# Patient Record
Sex: FEMALE | Race: WHITE | URBAN - NONMETROPOLITAN AREA
[De-identification: names, ages, dates, MRNs, and addresses within clinical notes are randomized per-mention and may not be internally consistent; named-entity substitution may affect disease eponyms.]

---

## 2017-06-20 ENCOUNTER — OPTICAL OFFICE (OUTPATIENT)
Dept: URBAN - NONMETROPOLITAN AREA CLINIC 4 | Facility: CLINIC | Age: 27
Setting detail: OPHTHALMOLOGY
End: 2017-06-20
Payer: COMMERCIAL

## 2017-06-20 DIAGNOSIS — H52.13: ICD-10-CM

## 2017-06-20 PROCEDURE — S0500 DISPOS CONT LENS: HCPCS | Performed by: OPTOMETRIST

## 2018-04-18 ENCOUNTER — DOCTOR'S OFFICE (OUTPATIENT)
Dept: URBAN - METROPOLITAN AREA CLINIC 136 | Facility: CLINIC | Age: 28
Setting detail: OPHTHALMOLOGY
End: 2018-04-18
Payer: COMMERCIAL

## 2018-04-18 DIAGNOSIS — H47.333: ICD-10-CM

## 2018-04-18 DIAGNOSIS — H52.13: ICD-10-CM

## 2018-04-18 DIAGNOSIS — H52.223: ICD-10-CM

## 2018-04-18 PROCEDURE — NO CHARGE N/C PROFESSIONAL COURTESY: Performed by: OPTOMETRIST

## 2018-04-18 PROCEDURE — 92133 CPTRZD OPH DX IMG PST SGM ON: CPT | Performed by: OPTOMETRIST

## 2018-04-18 ASSESSMENT — REFRACTION_OUTSIDERX
OD_VA1: 20/20
OS_VA1: 20/20
OD_VA2: 20/20
OS_VA2: 20/20
OS_VA3: 20/
OD_CYLINDER: SPH
OS_SPHERE: -2.00
OD_VA3: 20/
OD_SPHERE: -3.25
OU_VA: 20/20
OS_CYLINDER: -1.00
OS_AXIS: 010

## 2018-04-18 ASSESSMENT — SPHEQUIV_DERIVED
OD_SPHEQUIV: -4
OS_SPHEQUIV: -2.5

## 2018-04-18 ASSESSMENT — REFRACTION_MANIFEST
OU_VA: 20/
OD_VA3: 20/
OD_VA2: 20/
OS_VA2: 20/
OU_VA: 20/
OS_VA1: 20/
OS_VA2: 20/
OD_VA1: 20/
OS_VA1: 20/
OS_VA3: 20/
OD_VA1: 20/
OD_VA3: 20/
OD_VA2: 20/
OS_VA3: 20/

## 2018-04-18 ASSESSMENT — REFRACTION_CURRENTRX
OD_OVR_VA: 20/
OD_CYLINDER: -0.50
OS_AXIS: 010
OS_OVR_VA: 20/
OD_AXIS: 170
OS_SPHERE: -2.75
OS_OVR_VA: 20/
OD_SPHERE: -3.25
OD_OVR_VA: 20/
OS_OVR_VA: 20/
OD_OVR_VA: 20/
OS_CYLINDER: -0.50

## 2018-04-18 ASSESSMENT — VISUAL ACUITY
OD_BCVA: 20/20-2
OS_BCVA: 20/20-2

## 2018-04-18 ASSESSMENT — KERATOMETRY
OS_K1POWER_DIOPTERS: 46.25
OD_K1POWER_DIOPTERS: 46.00
OS_AXISANGLE_DEGREES: 07
OD_AXISANGLE_DEGREES: 171
OD_K2POWER_DIOPTERS: 47.25
METHOD_AUTO_MANUAL: AUTO
OS_K2POWER_DIOPTERS: 47.75

## 2018-04-18 ASSESSMENT — AXIALLENGTH_DERIVED
OD_AL: 24.0098
OS_AL: 23.2844

## 2018-04-18 ASSESSMENT — REFRACTION_AUTOREFRACTION
OD_CYLINDER: -0.50
OS_CYLINDER: -1.00
OD_SPHERE: -3.75
OS_SPHERE: -2.00
OD_AXIS: 17
OS_AXIS: 170

## 2018-04-18 ASSESSMENT — CONFRONTATIONAL VISUAL FIELD TEST (CVF)
OD_FINDINGS: FULL
OS_FINDINGS: FULL

## 2018-05-21 ENCOUNTER — OPTICAL OFFICE (OUTPATIENT)
Dept: URBAN - METROPOLITAN AREA CLINIC 140 | Facility: CLINIC | Age: 28
Setting detail: OPHTHALMOLOGY
End: 2018-05-21
Payer: COMMERCIAL

## 2018-05-21 DIAGNOSIS — H52.12: ICD-10-CM

## 2018-05-21 DIAGNOSIS — H52.11: ICD-10-CM

## 2018-05-21 PROCEDURE — S0500 DISPOS CONT LENS: HCPCS | Performed by: OPTOMETRIST

## 2019-01-04 ENCOUNTER — OPTICAL OFFICE (OUTPATIENT)
Dept: URBAN - NONMETROPOLITAN AREA CLINIC 4 | Facility: CLINIC | Age: 29
Setting detail: OPHTHALMOLOGY
End: 2019-01-04

## 2019-01-04 DIAGNOSIS — H52.13: ICD-10-CM

## 2019-01-04 PROCEDURE — S0500 DISPOS CONT LENS: HCPCS | Performed by: OPTOMETRIST

## 2019-06-18 ENCOUNTER — DOCTOR'S OFFICE (OUTPATIENT)
Dept: URBAN - METROPOLITAN AREA CLINIC 136 | Facility: CLINIC | Age: 29
Setting detail: OPHTHALMOLOGY
End: 2019-06-18
Payer: COMMERCIAL

## 2019-06-18 DIAGNOSIS — H52.13: ICD-10-CM

## 2019-06-18 DIAGNOSIS — H52.223: ICD-10-CM

## 2019-06-18 PROBLEM — H47.333 PSEUDOPAPILLEDEMA; BOTH EYES: Status: ACTIVE | Noted: 2019-06-18

## 2019-06-18 PROCEDURE — 92014 COMPRE OPH EXAM EST PT 1/>: CPT | Performed by: OPTOMETRIST

## 2019-06-18 ASSESSMENT — REFRACTION_CURRENTRX
OD_OVR_VA: 20/
OS_SPHERE: -2.75
OS_AXIS: 010
OD_CYLINDER: -0.50
OS_OVR_VA: 20/
OD_SPHERE: -3.25
OS_CYLINDER: -0.50
OD_AXIS: 170
OD_OVR_VA: 20/
OD_OVR_VA: 20/

## 2019-06-18 ASSESSMENT — REFRACTION_MANIFEST
OU_VA: 20/20
OD_VA1: 20/
OD_VA3: 20/
OD_VA2: 20/20
OD_SPHERE: -3.00
OS_VA2: 20/
OS_SPHERE: -1.50
OD_CYLINDER: SPH
OD_VA1: 20/20
OS_VA1: 20/
OD_VA3: 20/
OS_CYLINDER: -0.75
OS_VA3: 20/
OS_VA2: 20/20
OU_VA: 20/
OS_VA1: 20/20
OD_VA2: 20/
OS_AXIS: 180
OS_VA3: 20/

## 2019-06-18 ASSESSMENT — SPHEQUIV_DERIVED
OS_SPHEQUIV: -2.125
OD_SPHEQUIV: -3.75
OS_SPHEQUIV: -1.875

## 2019-06-18 ASSESSMENT — KERATOMETRY
OD_AXISANGLE_DEGREES: 171
OD_K1POWER_DIOPTERS: 46.00
OS_K1POWER_DIOPTERS: 46.25
METHOD_AUTO_MANUAL: AUTO
OD_K2POWER_DIOPTERS: 47.25
OS_K2POWER_DIOPTERS: 47.75
OS_AXISANGLE_DEGREES: 07

## 2019-06-18 ASSESSMENT — REFRACTION_AUTOREFRACTION
OS_SPHERE: -1.75
OS_AXIS: 168
OD_AXIS: 052
OS_CYLINDER: -0.75
OD_SPHERE: -3.50
OD_CYLINDER: -0.50

## 2019-06-18 ASSESSMENT — VISUAL ACUITY
OD_BCVA: 20/20-1
OS_BCVA: 20/20-1

## 2019-06-18 ASSESSMENT — AXIALLENGTH_DERIVED
OS_AL: 23.1429
OS_AL: 23.0496
OD_AL: 23.9093

## 2019-06-18 ASSESSMENT — CONFRONTATIONAL VISUAL FIELD TEST (CVF)
OD_FINDINGS: FULL
OS_FINDINGS: FULL

## 2019-07-09 ENCOUNTER — OPTICAL OFFICE (OUTPATIENT)
Dept: URBAN - NONMETROPOLITAN AREA CLINIC 4 | Facility: CLINIC | Age: 29
Setting detail: OPHTHALMOLOGY
End: 2019-07-09
Payer: COMMERCIAL

## 2019-07-09 DIAGNOSIS — H52.13: ICD-10-CM

## 2019-07-09 PROCEDURE — S0500 DISPOS CONT LENS: HCPCS | Performed by: OPTOMETRIST

## 2019-07-19 ENCOUNTER — DOCTOR'S OFFICE (OUTPATIENT)
Dept: URBAN - NONMETROPOLITAN AREA CLINIC 1 | Facility: CLINIC | Age: 29
Setting detail: OPHTHALMOLOGY
End: 2019-07-19
Payer: COMMERCIAL

## 2019-07-19 DIAGNOSIS — H47.333: ICD-10-CM

## 2019-07-19 PROCEDURE — 92083 EXTENDED VISUAL FIELD XM: CPT | Performed by: OPTOMETRIST

## 2022-01-28 ENCOUNTER — OFFICE VISIT (OUTPATIENT)
Dept: URGENT CARE | Facility: CLINIC | Age: 32
End: 2022-01-28
Payer: COMMERCIAL

## 2022-01-28 VITALS
RESPIRATION RATE: 15 BRPM | BODY MASS INDEX: 30.12 KG/M2 | OXYGEN SATURATION: 99 % | HEIGHT: 63 IN | TEMPERATURE: 97.5 F | WEIGHT: 170 LBS | HEART RATE: 92 BPM

## 2022-01-28 DIAGNOSIS — R68.83 CHILLS: Primary | ICD-10-CM

## 2022-01-28 DIAGNOSIS — R68.89 FLU-LIKE SYMPTOMS: ICD-10-CM

## 2022-01-28 PROCEDURE — U0003 INFECTIOUS AGENT DETECTION BY NUCLEIC ACID (DNA OR RNA); SEVERE ACUTE RESPIRATORY SYNDROME CORONAVIRUS 2 (SARS-COV-2) (CORONAVIRUS DISEASE [COVID-19]), AMPLIFIED PROBE TECHNIQUE, MAKING USE OF HIGH THROUGHPUT TECHNOLOGIES AS DESCRIBED BY CMS-2020-01-R: HCPCS | Performed by: EMERGENCY MEDICINE

## 2022-01-28 PROCEDURE — U0005 INFEC AGEN DETEC AMPLI PROBE: HCPCS | Performed by: EMERGENCY MEDICINE

## 2022-01-28 PROCEDURE — 99203 OFFICE O/P NEW LOW 30 MIN: CPT | Performed by: EMERGENCY MEDICINE

## 2022-01-28 RX ORDER — ONDANSETRON HYDROCHLORIDE 8 MG/1
8 TABLET, FILM COATED ORAL EVERY 8 HOURS PRN
Qty: 8 TABLET | Refills: 0 | Status: SHIPPED | OUTPATIENT
Start: 2022-01-28

## 2022-01-28 RX ORDER — NORETHINDRONE ACETATE AND ETHINYL ESTRADIOL 1; .02 MG/1; MG/1
1 TABLET ORAL DAILY
COMMUNITY
Start: 2022-01-26

## 2022-01-28 NOTE — PATIENT INSTRUCTIONS
You have been diagnosed with a flu-like illness, and your symptoms should resolve over the next 7 to 10 days with the treatments recommended today  If they do not, it is possible that you have developed a bacterial infection and you should return  If you were to take an antibiotic while you are still in the viral stage, you will not get better any faster, but could kill off good germs in your body as well as make germs resistant to the antibiotic  Take an expectorant - guaifenesin should be the only ingredient - during the day, and the cough suppressant (ex  Robitussin DM or Tessalon) if needed at night only  Take Zinc 50 mg every 12 hours for the next week  You should also take Quercetin 500 mg twice daily with it  You should also take vitamin D3 5000 i u s per day for the next 1 week, and vitamin-C 1 g daily  May take Flonase as discussed  You may also take a decongestant like Sudafed, unless you have hypertension or cardiac disease  You may take Imodium for diarrhea according to package instructions  Today you were tested for COVID-19 and influenza  Results will return approximately 5 days  The fastest way to receive results is to download the [facility] MyChart shawn on your phone or great account on a computer  If you view your results on [facility], we will not call you  If you do not see results on [facility], we will call you if your positive or negative  WE CANNOT PRINT YOUR TEST RESULTS FROM THE URGENT CARE  This is against a law called HIPAA  You may print results from your [facility] (IT help [phone] option 5) or call medical records at [phone]  Prophylactically self quarantine  Department of health's newest recommendations as of December 27,2021 state the following:     IF you TEST POSITIVE for COVID-19  -stay home for 5 days  If you have no symptoms or your symptoms are resolving after 5 days, you can leave your house  Continue to wear a mask around others for 5 additional days  If you have a fever, continue to stay home until you fever resolves  IF YOU WERE EXPOSED TO SOMEONE WITH COVID 19  -if you have been boosted OR completed the primary series of Pfizer or Moderna vaccine within the last 6 months OR completed the primary series of J&J vaccine within the last 2 months, wear a mask around others for 10 days  Get tested on day 5 if possible  If you develop symptoms, get a test and stay home      -if you completed the primary series of Pfizer or Moderna vaccine over 6 months ago and are NOT boosted OR completed the primary series of J&J over 2 months ago and are NOT boosted OR are unvaccinated, stay home for 5 days  After that continue to wear a mask around others for 5 additional days  If you can not quarantine you must wear a mask for 10 days  Test on day 5 if possible  If you develops symptoms get a test and stay home  Drink lots of fluids to maintain hydration  Do not touch your face, wash hands often, and practice social distancing  There is no treatment for simple outpatient COVID-19 patients however, CDC recommends 2000 units vitamin D3 to boost the immune system  Those with severe illness, older age, or multiple comorbidities may qualify for monoclonal antibody infusions as treatment  Please call your doctor to see if you qualify  Call your family doctor to have a follow-up appointment in next few days  Go to ER if he began experiencing chest pain, shortness of breath, fever that is not responding to antipyretics or other severe symptoms  Follow up with PCP in 3-5 days  Proceed to ER if symptoms worsen  Flu-like illness   AMBULATORY CARE:   Flu-like illness is an infection caused by a virus  The flu is easily spread when an infected person coughs, sneezes, or has close contact with others  You may be able to spread the flu to others for 1 week or longer after signs or symptoms appear     Common signs and symptoms include the following:   · Fever and chills    · Headaches, body aches, and muscle or joint pain    · Cough, runny nose, and sore throat    · Loss of appetite, nausea, vomiting, or diarrhea    · Tiredness    · Trouble breathing  Call 911 for any of the following:   · You have trouble breathing, and your lips look purple or blue  · You have a seizure  Seek care immediately if:   · You are dizzy, or you are urinating less or not at all  · You have a headache with a stiff neck, and you feel tired or confused  · You have new pain or pressure in your chest     · Your symptoms, such as shortness of breath, vomiting, or diarrhea, get worse  · Your symptoms, such as fever and coughing, seem to get better, but then get worse  Contact your healthcare provider if:   · You have new muscle pain or weakness  · You have questions or concerns about your condition or care  Treatment for influenza  may include any of the following:  · Acetaminophen decreases pain and fever  It is available without a doctor's order  Ask how much to take and how often to take it  Follow directions  Acetaminophen can cause liver damage if not taken correctly  · NSAIDs  such as ibuprofen, help decrease swelling, pain, and fever  This medicine is available with or without a doctor's order  NSAIDs can cause stomach bleeding or kidney problems in certain people  If you take blood thinner medicine, always ask your healthcare provider if NSAIDs are safe for you  Always read the medicine label and follow directions  · Antivirals  help fight a viral infection  Manage your symptoms:   · Rest  as much as you can to help you recover  · Drink liquids as directed  to help prevent dehydration  Ask how much liquid to drink each day and which liquids are best for you  Prevent the spread of the flu:   · Wash your hands often  Use soap and water  Wash your hands after you use the bathroom, change a child's diapers, or sneeze  Wash your hands before you prepare or eat food  Use gel hand cleanser when soap and water are not available  Do not touch your eyes, nose, or mouth unless you have washed your hands first        · Cover your mouth when you sneeze or cough  Cough into a tissue or the bend of your arm  · Clean shared items with a germ-killing   Clean table surfaces, doorknobs, and light switches  Do not share towels, silverware, and dishes with people who are sick  Wash bed sheets, towels, silverware, and dishes with soap and water  · Wear a mask  over your mouth and nose if you are sick or are near anyone who is sick  · Stay away from others  if you are sick  · Influenza vaccine  helps prevent influenza (flu)  Everyone older than 6 months should get a yearly influenza vaccine  Get the vaccine as soon as it is available, usually in September or October each year  Follow up with your healthcare provider as directed:  Write down your questions so you remember to ask them during your visits  © 2017 [employer]  Information is for End User's use only and may not be sold, redistributed or otherwise used for commercial purposes  All illustrations and images included in CareNotes® are the copyrighted property of [employer]  or [employer]  The above information is an  only  It is not intended as medical advice for individual conditions or treatments  Talk to your doctor, nurse or pharmacist before following any medical regimen to see if it is safe and effective for you  [address]     Your healthcare provider and/or public health staff have evaluated you and have determined that you do not need to be hospitalized at this time  At this time you can be isolated at home where you will be monitored by staff from your local or state health department   You should carefully follow the prevention and isolation steps below until a healthcare provider or local or state health department says that you can return to your normal activities  Stay home except to get medical care     People who are mildly ill with COVID-19 are able to isolate at home during their illness  You should restrict activities outside your home, except for getting medical care  Do not go to work, school, or public areas  Avoid using public transportation, ride-sharing, or taxis  Separate yourself from other people and animals in your home     People: As much as possible, you should stay in a specific room and away from other people in your home  Also, you should use a separate bathroom, if available  Animals: You should restrict contact with pets and other animals while you are sick with COVID-19, just like you would around other people  Although there have not been reports of pets or other animals becoming sick with COVID-19, it is still recommended that people sick with COVID-19 limit contact with animals until more information is known about the virus  When possible, have another member of your household care for your animals while you are sick  If you are sick with COVID-19, avoid contact with your pet, including petting, snuggling, being kissed or licked, and sharing food  If you must care for your pet or be around animals while you are sick, wash your hands before and after you interact with pets and wear a facemask  See COVID-19 and Animals for more information  Call ahead before visiting your doctor     If you have a medical appointment, call the healthcare provider and tell them that you have or may have COVID-19  This will help the healthcare providers office take steps to keep other people from getting infected or exposed  Wear a facemask     You should wear a facemask when you are around other people (e g , sharing a room or vehicle) or pets and before you enter a healthcare providers office   If you are not able to wear a facemask (for example, because it causes trouble breathing), then people who live with you should not stay in the same room with you, or they should wear a facemask if they enter your room  Cover your coughs and sneezes     Cover your mouth and nose with a tissue when you cough or sneeze  Throw used tissues in a lined trash can  Immediately wash your hands with soap and water for at least 20 seconds or, if soap and water are not available, clean your hands with an alcohol-based hand  that contains at least 60% alcohol  Clean your hands often     Wash your hands often with soap and water for at least 20 seconds, especially after blowing your nose, coughing, or sneezing; going to the bathroom; and before eating or preparing food  If soap and water are not readily available, use an alcohol-based hand  with at least 60% alcohol, covering all surfaces of your hands and rubbing them together until they feel dry  Soap and water are the best option if hands are visibly dirty  Avoid touching your eyes, nose, and mouth with unwashed hands  Avoid sharing personal household items     You should not share dishes, drinking glasses, cups, eating utensils, towels, or bedding with other people or pets in your home  After using these items, they should be washed thoroughly with soap and water  Clean all high-touch surfaces everyday     High touch surfaces include counters, tabletops, doorknobs, bathroom fixtures, toilets, phones, keyboards, tablets, and bedside tables  Also, clean any surfaces that may have blood, stool, or body fluids on them  Use a household cleaning spray or wipe, according to the label instructions  Labels contain instructions for safe and effective use of the cleaning product including precautions you should take when applying the product, such as wearing gloves and making sure you have good ventilation during use of the product  Monitor your symptoms     Seek prompt medical attention if your illness is worsening (e g , difficulty breathing)  Before seeking care, call your healthcare provider and tell them that you have, or are being evaluated for, COVID-19  Put on a facemask before you enter the facility  These steps will help the healthcare providers office to keep other people in the office or waiting room from getting infected or exposed  Ask your healthcare provider to call the local or state health department  Persons who are placed under active monitoring or facilitated self-monitoring should follow instructions provided by their local health department or occupational health professionals, as appropriate  If you have a medical emergency and need to call 911, notify the dispatch personnel that you have, or are being evaluated for COVID-19  If possible, put on a facemask before emergency medical services arrive  Discontinuing home isolation     Patients with confirmed COVID-19 should remain under home isolation precautions until the risk of secondary transmission to others is thought to be low  The decision to discontinue home isolation precautions should be made on a case-by-case basis, in consultation with healthcare providers and state and local health departments       Source: [employer]        Proceed to ER if symptoms worsen

## 2022-01-28 NOTE — PROGRESS NOTES
[facility]        NAME: Jose Chambers is a 36 y o  female  : 1990    MRN: [id number]  DATE: 2022  TIME: 8:26 AM    Assessment and Plan   Chills [R68 83]  1  Chills  COVID Only -Office Collect   2  Flu-like symptoms           Patient Instructions     Patient Instructions     You have been diagnosed with a flu-like illness, and your symptoms should resolve over the next 7 to 10 days with the treatments recommended today  If they do not, it is possible that you have developed a bacterial infection and you should return  If you were to take an antibiotic while you are still in the viral stage, you will not get better any faster, but could kill off good germs in your body as well as make germs resistant to the antibiotic  Take an expectorant - guaifenesin should be the only ingredient - during the day, and the cough suppressant (ex  Robitussin DM or Tessalon) if needed at night only  Take Zinc 50 mg every 12 hours for the next week  You should also take Quercetin 500 mg twice daily with it  You should also take vitamin D3 5000 i u s per day for the next 1 week, and vitamin-C 1 g daily  May take Flonase as discussed  You may also take a decongestant like Sudafed, unless you have hypertension or cardiac disease  You may take Imodium for diarrhea according to package instructions  Today you were tested for COVID-19 and influenza  Results will return approximately 5 days  The fastest way to receive results is to download the [facility] MyChart shawn on your phone or great account on a computer  If you view your results on [facility], we will not call you  If you do not see results on [facility], we will call you if your positive or negative  WE CANNOT PRINT YOUR TEST RESULTS FROM THE URGENT CARE  This is against a law called HIPAA  You may print results from your [facility] (IT help [phone] option 5) or call medical records at [phone]  Prophylactically self quarantine  Department of health's newest recommendations as of December 27,2021 state the following:     IF you TEST POSITIVE for COVID-19  -stay home for 5 days  If you have no symptoms or your symptoms are resolving after 5 days, you can leave your house  Continue to wear a mask around others for 5 additional days  If you have a fever, continue to stay home until you fever resolves  IF YOU WERE EXPOSED TO SOMEONE WITH COVID 19  -if you have been boosted OR completed the primary series of Pfizer or Moderna vaccine within the last 6 months OR completed the primary series of J&J vaccine within the last 2 months, wear a mask around others for 10 days  Get tested on day 5 if possible  If you develop symptoms, get a test and stay home      -if you completed the primary series of Pfizer or Moderna vaccine over 6 months ago and are NOT boosted OR completed the primary series of J&J over 2 months ago and are NOT boosted OR are unvaccinated, stay home for 5 days  After that continue to wear a mask around others for 5 additional days  If you can not quarantine you must wear a mask for 10 days  Test on day 5 if possible  If you develops symptoms get a test and stay home  Drink lots of fluids to maintain hydration  Do not touch your face, wash hands often, and practice social distancing  There is no treatment for simple outpatient COVID-19 patients however, CDC recommends 2000 units vitamin D3 to boost the immune system  Those with severe illness, older age, or multiple comorbidities may qualify for monoclonal antibody infusions as treatment  Please call your doctor to see if you qualify  Call your family doctor to have a follow-up appointment in next few days  Go to ER if he began experiencing chest pain, shortness of breath, fever that is not responding to antipyretics or other severe symptoms  Follow up with PCP in 3-5 days  Proceed to ER if symptoms worsen      Flu-like illness   AMBULATORY CARE:   Flu-like illness is an infection caused by a virus  The flu is easily spread when an infected person coughs, sneezes, or has close contact with others  You may be able to spread the flu to others for 1 week or longer after signs or symptoms appear  Common signs and symptoms include the following:   · Fever and chills    · Headaches, body aches, and muscle or joint pain    · Cough, runny nose, and sore throat    · Loss of appetite, nausea, vomiting, or diarrhea    · Tiredness    · Trouble breathing  Call 911 for any of the following:   · You have trouble breathing, and your lips look purple or blue  · You have a seizure  Seek care immediately if:   · You are dizzy, or you are urinating less or not at all  · You have a headache with a stiff neck, and you feel tired or confused  · You have new pain or pressure in your chest     · Your symptoms, such as shortness of breath, vomiting, or diarrhea, get worse  · Your symptoms, such as fever and coughing, seem to get better, but then get worse  Contact your healthcare provider if:   · You have new muscle pain or weakness  · You have questions or concerns about your condition or care  Treatment for influenza  may include any of the following:  · Acetaminophen decreases pain and fever  It is available without a doctor's order  Ask how much to take and how often to take it  Follow directions  Acetaminophen can cause liver damage if not taken correctly  · NSAIDs  such as ibuprofen, help decrease swelling, pain, and fever  This medicine is available with or without a doctor's order  NSAIDs can cause stomach bleeding or kidney problems in certain people  If you take blood thinner medicine, always ask your healthcare provider if NSAIDs are safe for you  Always read the medicine label and follow directions  · Antivirals  help fight a viral infection  Manage your symptoms:   · Rest  as much as you can to help you recover      · Drink liquids as directed  to help prevent dehydration  Ask how much liquid to drink each day and which liquids are best for you  Prevent the spread of the flu:   · Wash your hands often  Use soap and water  Wash your hands after you use the bathroom, change a child's diapers, or sneeze  Wash your hands before you prepare or eat food  Use gel hand cleanser when soap and water are not available  Do not touch your eyes, nose, or mouth unless you have washed your hands first        · Cover your mouth when you sneeze or cough  Cough into a tissue or the bend of your arm  · Clean shared items with a germ-killing   Clean table surfaces, doorknobs, and light switches  Do not share towels, silverware, and dishes with people who are sick  Wash bed sheets, towels, silverware, and dishes with soap and water  · Wear a mask  over your mouth and nose if you are sick or are near anyone who is sick  · Stay away from others  if you are sick  · Influenza vaccine  helps prevent influenza (flu)  Everyone older than 6 months should get a yearly influenza vaccine  Get the vaccine as soon as it is available, usually in September or October each year  Follow up with your healthcare provider as directed:  Write down your questions so you remember to ask them during your visits  © 2017 [employer]  Information is for End User's use only and may not be sold, redistributed or otherwise used for commercial purposes  All illustrations and images included in CareNotes® are the copyrighted property of [employer]  or [employer]  The above information is an  only  It is not intended as medical advice for individual conditions or treatments  Talk to your doctor, nurse or pharmacist before following any medical regimen to see if it is safe and effective for you       COVID-19    [address]     Your healthcare provider and/or public health staff have evaluated you and have determined that you do not need to be hospitalized at this time  At this time you can be isolated at home where you will be monitored by staff from your local or state health department  You should carefully follow the prevention and isolation steps below until a healthcare provider or local or state health department says that you can return to your normal activities  Stay home except to get medical care     People who are mildly ill with COVID-19 are able to isolate at home during their illness  You should restrict activities outside your home, except for getting medical care  Do not go to work, school, or public areas  Avoid using public transportation, ride-sharing, or taxis  Separate yourself from other people and animals in your home     People: As much as possible, you should stay in a specific room and away from other people in your home  Also, you should use a separate bathroom, if available  Animals: You should restrict contact with pets and other animals while you are sick with COVID-19, just like you would around other people  Although there have not been reports of pets or other animals becoming sick with COVID-19, it is still recommended that people sick with COVID-19 limit contact with animals until more information is known about the virus  When possible, have another member of your household care for your animals while you are sick  If you are sick with COVID-19, avoid contact with your pet, including petting, snuggling, being kissed or licked, and sharing food  If you must care for your pet or be around animals while you are sick, wash your hands before and after you interact with pets and wear a facemask  See COVID-19 and Animals for more information  Call ahead before visiting your doctor     If you have a medical appointment, call the healthcare provider and tell them that you have or may have COVID-19   This will help the healthcare providers office take steps to keep other people from getting infected or exposed  Wear a facemask     You should wear a facemask when you are around other people (e g , sharing a room or vehicle) or pets and before you enter a healthcare providers office  If you are not able to wear a facemask (for example, because it causes trouble breathing), then people who live with you should not stay in the same room with you, or they should wear a facemask if they enter your room  Cover your coughs and sneezes     Cover your mouth and nose with a tissue when you cough or sneeze  Throw used tissues in a lined trash can  Immediately wash your hands with soap and water for at least 20 seconds or, if soap and water are not available, clean your hands with an alcohol-based hand  that contains at least 60% alcohol  Clean your hands often     Wash your hands often with soap and water for at least 20 seconds, especially after blowing your nose, coughing, or sneezing; going to the bathroom; and before eating or preparing food  If soap and water are not readily available, use an alcohol-based hand  with at least 60% alcohol, covering all surfaces of your hands and rubbing them together until they feel dry  Soap and water are the best option if hands are visibly dirty  Avoid touching your eyes, nose, and mouth with unwashed hands  Avoid sharing personal household items     You should not share dishes, drinking glasses, cups, eating utensils, towels, or bedding with other people or pets in your home  After using these items, they should be washed thoroughly with soap and water  Clean all high-touch surfaces everyday     High touch surfaces include counters, tabletops, doorknobs, bathroom fixtures, toilets, phones, keyboards, tablets, and bedside tables  Also, clean any surfaces that may have blood, stool, or body fluids on them  Use a household cleaning spray or wipe, according to the label instructions   Labels contain instructions for safe and effective use of the cleaning product including precautions you should take when applying the product, such as wearing gloves and making sure you have good ventilation during use of the product  Monitor your symptoms     Seek prompt medical attention if your illness is worsening (e g , difficulty breathing)  Before seeking care, call your healthcare provider and tell them that you have, or are being evaluated for, COVID-19  Put on a facemask before you enter the facility  These steps will help the healthcare providers office to keep other people in the office or waiting room from getting infected or exposed  Ask your healthcare provider to call the local or [facility] health department  Persons who are placed under active monitoring or facilitated self-monitoring should follow instructions provided by their local health department or occupational health professionals, as appropriate  If you have a medical emergency and need to call 911, notify the dispatch personnel that you have, or are being evaluated for COVID-19  If possible, put on a facemask before emergency medical services arrive  Discontinuing home isolation     Patients with confirmed COVID-19 should remain under home isolation precautions until the risk of secondary transmission to others is thought to be low  The decision to discontinue home isolation precautions should be made on a case-by-case basis, in consultation with healthcare providers and [facility] and [facility] health departments  Source: [employer]        Proceed to ER if symptoms worsen          Follow up with PCP in 3-5 days  Proceed to  ER if symptoms worsen  Chief Complaint     Chief Complaint   Patient presents with    COVID-19     c/o nausea, headache, chills, onset yesterday, not fully vaccinated         History of Present Illness       Patient complains of chills, headaches, nausea since yesterday        Review of Systems   Review of Systems   Constitutional: Positive for chills  Negative for appetite change, fatigue and fever  HENT: Negative for congestion, rhinorrhea, sinus pressure, sore throat, trouble swallowing and voice change  Respiratory: Negative for cough, chest tightness, shortness of breath and wheezing  Cardiovascular: Negative for chest pain  Gastrointestinal: Positive for nausea  Musculoskeletal: Negative for myalgias  Neurological: Positive for headaches  Current Medications       Current Outpatient Medications:     norethindrone-ethinyl estradiol (MICROGESTIN 1/20) 1-20 MG-MCG per tablet, Take 1 tablet by mouth in the morning, Disp: , Rfl:     Current Allergies     Allergies as of 01/28/2022    (No Known Allergies)            The following portions of the patient's history were reviewed and updated as appropriate: allergies, current medications, past family history, past medical history, past social history, past surgical history and problem list      History reviewed  No pertinent past medical history  Past Surgical History:   Procedure Laterality Date    BREAST BIOPSY Right        Family History   Problem Relation Age of Onset    Heart disease Father          Medications have been verified  Objective   Ht 5' 3" (1 6 m)   Wt 77 1 kg (170 lb)   LMP 01/14/2022 (Approximate)   BMI 30 11 kg/m²        Physical Exam     Physical Exam  Vitals and nursing note reviewed  Constitutional:       General: She is not in acute distress  Appearance: She is well-developed  HENT:      Head: Normocephalic and atraumatic  Right Ear: Tympanic membrane and external ear normal       Left Ear: Tympanic membrane and external ear normal       Nose: Mucosal edema present  Mouth/Throat:      Pharynx: No oropharyngeal exudate or posterior oropharyngeal erythema  Tonsils: No tonsillar abscesses     Eyes:      Conjunctiva/sclera: Conjunctivae normal       Pupils: Pupils are equal, round, and reactive to light  Funduscopic exam:     Right eye: No hemorrhage or papilledema  Left eye: No hemorrhage or papilledema  Comments: Discs flat with sharp borders, no hemorrhages or exudates  Cardiovascular:      Rate and Rhythm: Normal rate and regular rhythm  Comments: Mild tachycardia  Pulmonary:      Effort: Pulmonary effort is normal  No respiratory distress  Breath sounds: No wheezing or rales  Abdominal:      General: Bowel sounds are normal       Palpations: Abdomen is soft  Musculoskeletal:      Cervical back: Neck supple  Skin:     General: Skin is warm and dry  Findings: No rash  Neurological:      Mental Status: She is alert and oriented to person, place, and time  Psychiatric:         Mood and Affect: Mood normal          Behavior: Behavior normal          Thought Content:  Thought content normal          Judgment: Judgment normal

## 2022-01-28 NOTE — LETTER
January 28, 2022     Patient: Dean Turner   YOB: 1990   Date of Visit: 1/28/2022       To Whom it May Concern:    Dean Turner was seen in my clinic on 1/28/2022  She should remain out of work/school for 5 days since symptom onset or 24 hours fever free without the use of fever reducing drugs, whichever is longer AND overall general improvement in symptoms and must adhere to strict masking guidelines for 5 more days         If you have any questions or concerns, please don't hesitate to call           Sincerely,          Óscar Deleon MD        CC: No Recipients

## 2022-01-29 LAB — SARS-COV-2 RNA RESP QL NAA+PROBE: NEGATIVE

## 2022-07-20 NOTE — PATIENT INSTRUCTIONS
Wellness Visit for Adults   AMBULATORY CARE:   A wellness visit  is when you see your healthcare provider to get screened for health problems  Your healthcare provider will also give you advice on how to stay healthy  Write down your questions so you remember to ask them  Ask your healthcare provider how often you should have a wellness visit  What happens at a wellness visit:  Your healthcare provider will ask about your health, and your family history of health problems  This includes high blood pressure, heart disease, and cancer  He or she will ask if you have symptoms that concern you, if you smoke, and about your mood  You may also be asked about your intake of medicines, supplements, food, and alcohol  Any of the following may be done: Your weight  will be checked  Your height may also be checked so your body mass index (BMI) can be calculated  Your BMI shows if you are at a healthy weight  Your blood pressure  and heart rate will be checked  Your temperature may also be checked  Blood and urine tests  may be done  Blood tests may be done to check your cholesterol levels  Abnormal cholesterol levels increase your risk for heart disease and stroke  You may also need a blood or urine test to check for diabetes if you are at increased risk  Urine tests may be done to look for signs of an infection or kidney disease  A physical exam  includes checking your heartbeat and lungs with a stethoscope  Your healthcare provider may also check your skin to look for sun damage  Screening tests  may be recommended  A screening test is done to check for diseases that may not cause symptoms  The screening tests you may need depend on your age, gender, family history, and lifestyle habits  For example, colorectal screening may be recommended if you are 48years old or older  Screening tests you need if you are a woman:   A Pap smear  is used to screen for cervical cancer   Pap smears are usually done every 3 to 5 years depending on your age  You may need them more often if you have had abnormal Pap smear test results in the past  Ask your healthcare provider how often you should have a Pap smear  A mammogram  is an x-ray of your breasts to screen for breast cancer  Experts recommend mammograms every 2 years starting at age 48 years  You may need a mammogram at age 52 years or younger if you have an increased risk for breast cancer  Talk to your healthcare provider about when you should start having mammograms and how often you need them  Vaccines you may need:   Get an influenza vaccine  every year  The influenza vaccine protects you from the flu  Several types of viruses cause the flu  The viruses change over time, so new vaccines are made each year  Get a tetanus-diphtheria (Td) booster vaccine  every 10 years  This vaccine protects you against tetanus and diphtheria  Tetanus is a severe infection that may cause painful muscle spasms and lockjaw  Diphtheria is a severe bacterial infection that causes a thick covering in the back of your mouth and throat  Get a human papillomavirus (HPV) vaccine  if you are female and aged 23 to 32 or male 23 to 24 and never received it  This vaccine protects you from HPV infection  HPV is the most common infection spread by sexual contact  HPV may also cause vaginal, penile, and anal cancers  Get a pneumococcal vaccine  if you are aged 72 years or older  The pneumococcal vaccine is an injection given to protect you from pneumococcal disease  Pneumococcal disease is an infection caused by pneumococcal bacteria  The infection may cause pneumonia, meningitis, or an ear infection  Get a shingles vaccine  if you are 60 or older, even if you have had shingles before  The shingles vaccine is an injection to protect you from the varicella-zoster virus  This is the same virus that causes chickenpox   Shingles is a painful rash that develops in people who had chickenpox or have been exposed to the virus  How to eat healthy:  My Plate is a model for planning healthy meals  It shows the types and amounts of foods that should go on your plate  Fruits and vegetables make up about half of your plate, and grains and protein make up the other half  A serving of dairy is included on the side of your plate  The amount of calories and serving sizes you need depends on your age, gender, weight, and height  Examples of healthy foods are listed below:  Eat a variety of vegetables  such as dark green, red, and orange vegetables  You can also include canned vegetables low in sodium (salt) and frozen vegetables without added butter or sauces  Eat a variety of fresh fruits , canned fruit in 100% juice, frozen fruit, and dried fruit  Include whole grains  At least half of the grains you eat should be whole grains  Examples include whole-wheat bread, wheat pasta, brown rice, and whole-grain cereals such as oatmeal     Eat a variety of protein foods such as seafood (fish and shellfish), lean meat, and poultry without skin (turkey and chicken)  Examples of lean meats include pork leg, shoulder, or tenderloin, and beef round, sirloin, tenderloin, and extra lean ground beef  Other protein foods include eggs and egg substitutes, beans, peas, soy products, nuts, and seeds  Choose low-fat dairy products such as skim or 1% milk or low-fat yogurt, cheese, and cottage cheese  Limit unhealthy fats  such as butter, hard margarine, and shortening  Exercise:  Exercise at least 30 minutes per day on most days of the week  Some examples of exercise include walking, biking, dancing, and swimming  You can also fit in more physical activity by taking the stairs instead of the elevator or parking farther away from stores  Include muscle strengthening activities 2 days each week  Regular exercise provides many health benefits   It helps you manage your weight, and decreases your risk for type 2 diabetes, heart disease, stroke, and high blood pressure  Exercise can also help improve your mood  Ask your healthcare provider about the best exercise plan for you  General health and safety guidelines:   Do not smoke  Nicotine and other chemicals in cigarettes and cigars can cause lung damage  Ask your healthcare provider for information if you currently smoke and need help to quit  E-cigarettes or smokeless tobacco still contain nicotine  Talk to your healthcare provider before you use these products  Limit alcohol  A drink of alcohol is 12 ounces of beer, 5 ounces of wine, or 1½ ounces of liquor  Lose weight, if needed  Being overweight increases your risk of certain health conditions  These include heart disease, high blood pressure, type 2 diabetes, and certain types of cancer  Protect your skin  Do not sunbathe or use tanning beds  Use sunscreen with a SPF 15 or higher  Apply sunscreen at least 15 minutes before you go outside  Reapply sunscreen every 2 hours  Wear protective clothing, hats, and sunglasses when you are outside  Drive safely  Always wear your seatbelt  Make sure everyone in your car wears a seatbelt  A seatbelt can save your life if you are in an accident  Do not use your cell phone when you are driving  This could distract you and cause an accident  Pull over if you need to make a call or send a text message  Practice safe sex  Use latex condoms if are sexually active and have more than one partner  Your healthcare provider may recommend screening tests for sexually transmitted infections (STIs)  Wear helmets, lifejackets, and protective gear  Always wear a helmet when you ride a bike or motorcycle, go skiing, or play sports that could cause a head injury  Wear protective equipment when you play sports  Wear a lifejacket when you are on a boat or doing water sports      © Copyright [address] 2022 Information is for End User's use only and may not be sold, redistributed or otherwise used for commercial purposes  All illustrations and images included in CareNotes® are the copyrighted property of A D A M [employer] Inc  or Fouzia Padgett  The above information is an  only  It is not intended as medical advice for individual conditions or treatments  Talk to your doctor, nurse or pharmacist before following any medical regimen to see if it is safe and effective for you

## 2022-07-20 NOTE — PROGRESS NOTES
Assessment        Diagnoses and all orders for this visit:    Encntr for gyn exam (general) (routine) w/o abn findings    Cervical cancer screening  -     Liquid-based pap, screening    Encounter for birth control pills maintenance    Screen for STD (sexually transmitted disease)  -     Chlamydia/GC amplified DNA by PCR    Dysmenorrhea  -     US pelvis complete w transvaginal; Future             Plan      All questions answered  Await pap smear results  Contraception: OCP (estrogen/progesterone)  Diagnosis explained in detail, including differential   Follow up in 2 weeks  Follow up as needed  GC specimen  Pap smear  Pelvic ultrasound  Follow-up to discuss US and possibly change OCPs - declines change in OCP for now      Subjective      Maninder Perry is a 29 y o  female who presents for annual exam       Chief Complaint   Patient presents with   [facility] Patient Visit    Gynecologic Exam     Patient with c/o painful periods  Patient states pain seems to be getting worse with aging  Patient states the flow does change from light to heavy  Does have a lot of morning nausea that is worse in the morning  x6 months  Birth control pill through [facility] for birth control and painful periods  She is sexually active, same partner x 2 years, no recent STD testing, no h/o STD    She has painful periods  She has light to heavy periods  She has been on JUNEL 1/20 x 1 year  She reports no problems with the oral contraception  She reports normal periods  She denies any missed pills  She denies any breakthrough bleeding  She denies any untoward side effects including headache  She has no increase in headaches but has had nausea  She has a period every 3 weeks on the pill  She states periods have been worse on the pill    She has pelvic cramping and nausea without periods  She has no dyspareunia    She has migraines without aura                Last Pap: 10 years ago [facility]  Last mammogram:  4-5 years ago, biopsy  Colorectal cancer screening: none  DEXA:    HPV vaccine completed:yes - 3 doses  Current contraception: OCP (estrogen/progesterone)  History of abnormal Pap smear: no  History of abnormal mammogram: no  Family history of uterine or ovarian cancer: no  Family history of breast cancer: no  Family history of colon cancer: no        Menstrual History:  OB History        0    Para   0    Term   0       0    AB   0    Living   0       SAB   0    IAB   0    Ectopic   0    Multiple   0    Live Births   0                Menarche age: 15-12  Patient's last menstrual period was 2022 (approximate)  Period Pattern: (!) Irregular          History reviewed  No pertinent past medical history  Past Surgical History:   Procedure Laterality Date    BREAST BIOPSY Right      Family History   Problem Relation Age of Onset    Heart disease Father        Social History     Tobacco Use    Smoking status: Current Some Day Smoker     Packs/day: 1 00    Smokeless tobacco: Never Used   Vaping Use    Vaping Use: Never used   Substance Use Topics    Alcohol use: Yes     Comment: Rare    Drug use: Never          Current Outpatient Medications:     norethindrone-ethinyl estradiol (MICROGESTIN ) 1-20 MG-MCG per tablet, Take 1 tablet by mouth in the morning, Disp: , Rfl:     ondansetron (ZOFRAN) 8 mg tablet, Take 1 tablet (8 mg total) by mouth every 8 (eight) hours as needed for nausea (Patient not taking: Reported on 2022), Disp: 8 tablet, Rfl: 0    No Known Allergies        Review of Systems   Constitutional: Negative  HENT: Negative  Eyes: Negative  Respiratory: Negative  Cardiovascular: Negative  Gastrointestinal: Negative  Endocrine: Negative  Genitourinary:        As noted in HPI   Musculoskeletal: Negative  Skin: Negative  Allergic/Immunologic: Negative  Neurological: Negative  Hematological: Negative      Psychiatric/Behavioral: Negative  /80 (BP Location: Right arm, Patient Position: Sitting, Cuff Size: Adult)   Pulse 73   Temp 98 2 °F (36 8 °C) (Tympanic)   Ht 5' 3" (1 6 m)   Wt 71 2 kg (157 lb)   LMP 07/07/2022 (Approximate)   BMI 27 81 kg/m²         Physical Exam  Constitutional:       Appearance: She is well-developed  Genitourinary:      Vulva, bladder and rectum normal       No lesions in the vagina  Genitourinary Comments:         Right Labia: No rash, tenderness, lesions, skin changes or Bartholin's cyst      Left Labia: No tenderness, lesions, skin changes, Bartholin's cyst or rash  No inguinal adenopathy present in the right or left side  No vaginal discharge, tenderness or bleeding  No vaginal prolapse present  No vaginal atrophy present  Right Adnexa: not tender, not full and no mass present  Left Adnexa: not tender, not full and no mass present  No cervical motion tenderness, friability, lesion or polyp  Uterus is not enlarged or tender  Pelvic exam was performed with patient in the lithotomy position  Rectum:      No external hemorrhoid  Breasts:      Right: No mass, nipple discharge, skin change or tenderness  Left: Inverted nipple present  No mass, nipple discharge, skin change or tenderness  HENT:      Head: Normocephalic  Nose: Nose normal    Eyes:      Conjunctiva/sclera: Conjunctivae normal    Neck:      Thyroid: No thyromegaly  Cardiovascular:      Rate and Rhythm: Normal rate and regular rhythm  Heart sounds: Normal heart sounds  No murmur heard  Pulmonary:      Effort: Pulmonary effort is normal  No respiratory distress  Breath sounds: Normal breath sounds  No wheezing or rales  Abdominal:      General: There is no distension  Palpations: Abdomen is soft  There is no mass  Tenderness: There is no abdominal tenderness  There is no guarding or rebound  Musculoskeletal:         General: No tenderness  Cervical back: Neck supple  No muscular tenderness  Lymphadenopathy:      Cervical: No cervical adenopathy  Lower Body: No right inguinal adenopathy  No left inguinal adenopathy  Neurological:      Mental Status: She is alert and oriented to person, place, and time  Skin:     General: Skin is warm and dry     Psychiatric:         Mood and Affect: Mood normal          Behavior: Behavior normal

## 2022-07-21 ENCOUNTER — OFFICE VISIT (OUTPATIENT)
Dept: OBGYN CLINIC | Facility: CLINIC | Age: 32
End: 2022-07-21
Payer: COMMERCIAL

## 2022-07-21 VITALS
HEIGHT: 63 IN | TEMPERATURE: 98.2 F | SYSTOLIC BLOOD PRESSURE: 138 MMHG | BODY MASS INDEX: 27.82 KG/M2 | HEART RATE: 73 BPM | DIASTOLIC BLOOD PRESSURE: 80 MMHG | WEIGHT: 157 LBS

## 2022-07-21 DIAGNOSIS — Z11.3 SCREEN FOR STD (SEXUALLY TRANSMITTED DISEASE): ICD-10-CM

## 2022-07-21 DIAGNOSIS — Z01.419 ENCNTR FOR GYN EXAM (GENERAL) (ROUTINE) W/O ABN FINDINGS: Primary | ICD-10-CM

## 2022-07-21 DIAGNOSIS — N94.6 DYSMENORRHEA: ICD-10-CM

## 2022-07-21 DIAGNOSIS — Z12.4 CERVICAL CANCER SCREENING: ICD-10-CM

## 2022-07-21 DIAGNOSIS — Z30.41 ENCOUNTER FOR BIRTH CONTROL PILLS MAINTENANCE: ICD-10-CM

## 2022-07-21 PROCEDURE — S0610 ANNUAL GYNECOLOGICAL EXAMINA: HCPCS | Performed by: OBSTETRICS & GYNECOLOGY

## 2022-07-21 PROCEDURE — G0145 SCR C/V CYTO,THINLAYER,RESCR: HCPCS | Performed by: OBSTETRICS & GYNECOLOGY

## 2022-07-21 PROCEDURE — G0476 HPV COMBO ASSAY CA SCREEN: HCPCS | Performed by: OBSTETRICS & GYNECOLOGY

## 2022-07-21 PROCEDURE — 87491 CHLMYD TRACH DNA AMP PROBE: CPT | Performed by: OBSTETRICS & GYNECOLOGY

## 2022-07-21 PROCEDURE — 87591 N.GONORRHOEAE DNA AMP PROB: CPT | Performed by: OBSTETRICS & GYNECOLOGY

## 2022-07-22 ENCOUNTER — HOSPITAL ENCOUNTER (OUTPATIENT)
Dept: ULTRASOUND IMAGING | Facility: HOSPITAL | Age: 32
Discharge: HOME | End: 2022-07-22
Attending: OBSTETRICS & GYNECOLOGY
Payer: COMMERCIAL

## 2022-07-22 DIAGNOSIS — N94.6 DYSMENORRHEA: ICD-10-CM

## 2022-07-22 LAB
C TRACH DNA SPEC QL NAA+PROBE: NEGATIVE
HPV HR 12 DNA CVX QL NAA+PROBE: NEGATIVE
HPV16 DNA CVX QL NAA+PROBE: NEGATIVE
HPV18 DNA CVX QL NAA+PROBE: NEGATIVE
N GONORRHOEA DNA SPEC QL NAA+PROBE: NEGATIVE

## 2022-07-22 PROCEDURE — 76856 US EXAM PELVIC COMPLETE: CPT

## 2022-07-22 PROCEDURE — 76830 TRANSVAGINAL US NON-OB: CPT

## 2022-07-27 LAB
LAB AP GYN PRIMARY INTERPRETATION: NORMAL
Lab: NORMAL

## 2022-08-17 NOTE — PROGRESS NOTES
Assessment/Plan:    Problem List Items Addressed This Visit    None     Visit Diagnoses     Dysmenorrhea    -  Primary    Relevant Medications    norethindrone-ethinyl estradiol (MICROGESTIN ) 1-20 MG-MCG per tablet    Encounter for birth control pills maintenance        Relevant Medications    norethindrone-ethinyl estradiol (MICROGESTIN ) 1-20 MG-MCG per tablet        Discussed with patient pelvic ultrasound results in detail  No evidence of ovarian cyst or uterine fibroids that would explain pelvic pain  Discussed with patient other differential diagnosis such as endometriosis  Discussed definitive diagnosis with laparoscopy which she would like to defer for now  Advised she may take ibuprofen 600 mg every 6 hours as needed for pain  Offered change in contraception to extended cycle by using the same formulation  Advised patient to continue Loestrin 120 and to skip placebo pills for the 1st 3 packs and to take placebo pills for the 4th pack  Discussed with patient higher incidence of breakthrough bleeding and to avoid missed pills  Advised patient follow-up in 6 months for medication check and refill  Advised patient to call if with untoward side effects or if she needs to be seen sooner  Subjective   Patient ID: Nicole Posadas is a 29 y o  female  Patient is here for a follow-up  Chief Complaint   Patient presents with    Follow-up     Discussion of results  Pt presented with painful periods despite being on OCPs  She takes Ibuprofen 400 mg/day  She had a period on 22-22, painful, less heavy  She states pain is 6/10  Menstrual History:  OB History        0    Para   0    Term   0       0    AB   0    Living   0       SAB   0    IAB   0    Ectopic   0    Multiple   0    Live Births   0              No LMP recorded  History reviewed  No pertinent past medical history      Past Surgical History:   Procedure Laterality Date    BREAST BIOPSY Right        Social History     Tobacco Use    Smoking status: Current Some Day Smoker     Packs/day: 1 00    Smokeless tobacco: Never Used   Vaping Use    Vaping Use: Never used   Substance Use Topics    Alcohol use: Yes     Comment: Rare    Drug use: Never       No Known Allergies      Current Outpatient Medications:     norethindrone-ethinyl estradiol (MICROGESTIN 1/20) 1-20 MG-MCG per tablet, Take 1 tablet by mouth in the morning Dispense 4 packs at a time, discard placebo pills for the first 3 packs, take placebo pills for the 4th pack, Disp: 112 tablet, Rfl: 4    ondansetron (ZOFRAN) 8 mg tablet, Take 1 tablet (8 mg total) by mouth every 8 (eight) hours as needed for nausea (Patient not taking: No sig reported), Disp: 8 tablet, Rfl: 0      Review of Systems   Constitutional: Negative  HENT: Negative  Eyes: Negative  Respiratory: Negative  Cardiovascular: Negative  Gastrointestinal: Negative  Endocrine: Negative  Genitourinary:        As noted in HPI   Musculoskeletal: Negative  Skin: Negative  Allergic/Immunologic: Negative  Neurological: Negative  Hematological: Negative  Psychiatric/Behavioral: Negative  /80 (BP Location: Right arm, Patient Position: Sitting, Cuff Size: Adult)   Pulse 88   Wt 72 6 kg (160 lb)   BMI 28 34 kg/m²       Physical Exam  Constitutional:       General: She is not in acute distress  Appearance: She is well-developed  Abdominal:      Palpations: Abdomen is soft  Tenderness: There is no abdominal tenderness  There is no guarding  Neurological:      Mental Status: She is alert and oriented to person, place, and time  Skin:     General: Skin is warm and dry  Psychiatric:         Behavior: Behavior normal                The remainder of her physical examination was deferred as she was here today for consultation and discussion      I have spent 20 minutes on day of encounter, time spent involved pre-charting, review of previous records, face to face encounter, counseling and post visit documentation  US pelvis complete w transvaginal    Status: Final result               PACS Images     Show images for US pelvis complete w transvaginal      Study Result    Narrative & Impression   PELVIC ULTRASOUND, COMPLETE     INDICATION:  The patient is 29years old  N94 [id number]: Dysmenorrhea, unspecified      COMPARISON: None     TECHNIQUE:   Transabdominal pelvic ultrasound was performed in sagittal and transverse planes with a curvilinear transducer  Additional transvaginal imaging was performed to better evaluate the endometrium and ovaries  Imaging included volumetric   sweeps as well as traditional still imaging technique      FINDINGS:     UTERUS:  The uterus is anteverted in position, measuring 7 9 x 3 1 x 4 6 cm  The uterus has a normal contour and echotexture  The cervix appears within normal limits      ENDOMETRIUM:    The endometrial echo complex has an AP caliber of 5 0 mm  Its appearance is within normal limits for age and cycle and shows no filling defects        OVARIES/ADNEXA:  Right ovary:  2 8 x 2 7 x 1 4 cm  5 5 mL  No suspicious right ovarian abnormality  Doppler flow within normal limits      Left ovary:  3 3 x 2 6 x 2 5 cm  11 0 mL  No suspicious left ovarian abnormality  There is a 2 5 cm dominant follicle and additional small follicle  Doppler flow within normal limits      No suspicious adnexal mass or loculated collections    There is no free fluid      IMPRESSION:     Unremarkable examination                     Workstation performed: [id number]        Imaging    US pelvis complete w transvaginal (Order: [id number]) - 7/22/2022    Result History    US pelvis complete w transvaginal (Order #[id number]) on 7/27/2022 - Order Result History Report      Order Report     Order Details

## 2022-08-18 ENCOUNTER — OFFICE VISIT (OUTPATIENT)
Dept: OBGYN CLINIC | Facility: CLINIC | Age: 32
End: 2022-08-18
Payer: COMMERCIAL

## 2022-08-18 VITALS
SYSTOLIC BLOOD PRESSURE: 122 MMHG | BODY MASS INDEX: 28.34 KG/M2 | DIASTOLIC BLOOD PRESSURE: 80 MMHG | WEIGHT: 160 LBS | HEART RATE: 88 BPM

## 2022-08-18 DIAGNOSIS — N94.6 DYSMENORRHEA: Primary | ICD-10-CM

## 2022-08-18 DIAGNOSIS — Z30.41 ENCOUNTER FOR BIRTH CONTROL PILLS MAINTENANCE: ICD-10-CM

## 2022-08-18 PROCEDURE — 99213 OFFICE O/P EST LOW 20 MIN: CPT | Performed by: OBSTETRICS & GYNECOLOGY

## 2022-08-18 RX ORDER — NORETHINDRONE ACETATE AND ETHINYL ESTRADIOL 1; .02 MG/1; MG/1
1 TABLET ORAL DAILY
Qty: 112 TABLET | Refills: 4 | Status: SHIPPED | OUTPATIENT
Start: 2022-08-18

## 2023-09-12 ENCOUNTER — HOSPITAL ENCOUNTER (EMERGENCY)
Facility: HOSPITAL | Age: 33
Discharge: HOME | End: 2023-09-12
Attending: EMERGENCY MEDICINE | Admitting: EMERGENCY MEDICINE
Payer: COMMERCIAL

## 2023-09-12 ENCOUNTER — APPOINTMENT (EMERGENCY)
Dept: CT IMAGING | Facility: HOSPITAL | Age: 33
End: 2023-09-12
Payer: COMMERCIAL

## 2023-09-12 VITALS
SYSTOLIC BLOOD PRESSURE: 150 MMHG | WEIGHT: 160 LBS | RESPIRATION RATE: 16 BRPM | OXYGEN SATURATION: 98 % | DIASTOLIC BLOOD PRESSURE: 83 MMHG | TEMPERATURE: 98.1 F | BODY MASS INDEX: 28.34 KG/M2 | HEART RATE: 78 BPM

## 2023-09-12 DIAGNOSIS — E87.6 HYPOKALEMIA: Primary | ICD-10-CM

## 2023-09-12 DIAGNOSIS — T78.40XA ALLERGIC REACTION, INITIAL ENCOUNTER: ICD-10-CM

## 2023-09-12 DIAGNOSIS — K52.9 PROCTOCOLITIS: ICD-10-CM

## 2023-09-12 LAB
ALBUMIN SERPL BCP-MCNC: 4.4 G/DL (ref 3.5–5)
ALP SERPL-CCNC: 49 U/L (ref 34–104)
ALT SERPL W P-5'-P-CCNC: 9 U/L (ref 7–52)
ANION GAP SERPL CALCULATED.3IONS-SCNC: 8 MMOL/L
APTT PPP: 26 SECONDS (ref 23–37)
AST SERPL W P-5'-P-CCNC: 11 U/L (ref 13–39)
B-HCG SERPL-ACNC: <1 MIU/ML (ref 0–5)
BASOPHILS # BLD AUTO: 0.03 THOUSANDS/ÂΜL (ref 0–0.1)
BASOPHILS NFR BLD AUTO: 0 % (ref 0–1)
BILIRUB SERPL-MCNC: 0.77 MG/DL (ref 0.2–1)
BILIRUB UR QL STRIP: NEGATIVE
BUN SERPL-MCNC: 7 MG/DL (ref 5–25)
CALCIUM SERPL-MCNC: 9.6 MG/DL (ref 8.4–10.2)
CHLORIDE SERPL-SCNC: 104 MMOL/L (ref 96–108)
CLARITY UR: CLEAR
CO2 SERPL-SCNC: 24 MMOL/L (ref 21–32)
COLOR UR: YELLOW
CREAT SERPL-MCNC: 0.72 MG/DL (ref 0.6–1.3)
EOSINOPHIL # BLD AUTO: 0.02 THOUSAND/ÂΜL (ref 0–0.61)
EOSINOPHIL NFR BLD AUTO: 0 % (ref 0–6)
ERYTHROCYTE [DISTWIDTH] IN BLOOD BY AUTOMATED COUNT: 17.1 % (ref 11.6–15.1)
EXT PREGNANCY TEST URINE: NEGATIVE
EXT. CONTROL: NORMAL
GFR SERPL CREATININE-BSD FRML MDRD: 111 ML/MIN/1.73SQ M
GLUCOSE SERPL-MCNC: 104 MG/DL (ref 65–140)
GLUCOSE UR STRIP-MCNC: NEGATIVE MG/DL
HCT VFR BLD AUTO: 39.9 % (ref 34.8–46.1)
HGB BLD-MCNC: 13 G/DL (ref 11.5–15.4)
HGB UR QL STRIP.AUTO: NEGATIVE
IMM GRANULOCYTES # BLD AUTO: 0.02 THOUSAND/UL (ref 0–0.2)
IMM GRANULOCYTES NFR BLD AUTO: 0 % (ref 0–2)
INR PPP: 0.98 (ref 0.84–1.19)
KETONES UR STRIP-MCNC: NEGATIVE MG/DL
LACTATE SERPL-SCNC: 0.7 MMOL/L (ref 0.5–2)
LEUKOCYTE ESTERASE UR QL STRIP: NEGATIVE
LIPASE SERPL-CCNC: <6 U/L (ref 11–82)
LYMPHOCYTES # BLD AUTO: 2.61 THOUSANDS/ÂΜL (ref 0.6–4.47)
LYMPHOCYTES NFR BLD AUTO: 31 % (ref 14–44)
MAGNESIUM SERPL-MCNC: 2 MG/DL (ref 1.9–2.7)
MCH RBC QN AUTO: 27.5 PG (ref 26.8–34.3)
MCHC RBC AUTO-ENTMCNC: 32.6 G/DL (ref 31.4–37.4)
MCV RBC AUTO: 85 FL (ref 82–98)
MONOCYTES # BLD AUTO: 0.78 THOUSAND/ÂΜL (ref 0.17–1.22)
MONOCYTES NFR BLD AUTO: 9 % (ref 4–12)
NEUTROPHILS # BLD AUTO: 5.01 THOUSANDS/ÂΜL (ref 1.85–7.62)
NEUTS SEG NFR BLD AUTO: 60 % (ref 43–75)
NITRITE UR QL STRIP: NEGATIVE
NRBC BLD AUTO-RTO: 0 /100 WBCS
PH UR STRIP.AUTO: 7 [PH]
PLATELET # BLD AUTO: 218 THOUSANDS/UL (ref 149–390)
PMV BLD AUTO: 11.4 FL (ref 8.9–12.7)
POTASSIUM SERPL-SCNC: 3.4 MMOL/L (ref 3.5–5.3)
PROT SERPL-MCNC: 7.8 G/DL (ref 6.4–8.4)
PROT UR STRIP-MCNC: NEGATIVE MG/DL
PROTHROMBIN TIME: 13.3 SECONDS (ref 11.6–14.5)
RBC # BLD AUTO: 4.72 MILLION/UL (ref 3.81–5.12)
SODIUM SERPL-SCNC: 136 MMOL/L (ref 135–147)
SP GR UR STRIP.AUTO: 1.01 (ref 1–1.03)
UROBILINOGEN UR QL STRIP.AUTO: 0.2 E.U./DL
WBC # BLD AUTO: 8.47 THOUSAND/UL (ref 4.31–10.16)

## 2023-09-12 PROCEDURE — 84702 CHORIONIC GONADOTROPIN TEST: CPT | Performed by: EMERGENCY MEDICINE

## 2023-09-12 PROCEDURE — 96374 THER/PROPH/DIAG INJ IV PUSH: CPT

## 2023-09-12 PROCEDURE — 96361 HYDRATE IV INFUSION ADD-ON: CPT

## 2023-09-12 PROCEDURE — 83690 ASSAY OF LIPASE: CPT | Performed by: EMERGENCY MEDICINE

## 2023-09-12 PROCEDURE — G1004 CDSM NDSC: HCPCS

## 2023-09-12 PROCEDURE — 83735 ASSAY OF MAGNESIUM: CPT | Performed by: EMERGENCY MEDICINE

## 2023-09-12 PROCEDURE — 99283 EMERGENCY DEPT VISIT LOW MDM: CPT

## 2023-09-12 PROCEDURE — 85610 PROTHROMBIN TIME: CPT | Performed by: EMERGENCY MEDICINE

## 2023-09-12 PROCEDURE — 83605 ASSAY OF LACTIC ACID: CPT | Performed by: EMERGENCY MEDICINE

## 2023-09-12 PROCEDURE — 99285 EMERGENCY DEPT VISIT HI MDM: CPT | Performed by: EMERGENCY MEDICINE

## 2023-09-12 PROCEDURE — 96375 TX/PRO/DX INJ NEW DRUG ADDON: CPT

## 2023-09-12 PROCEDURE — 74177 CT ABD & PELVIS W/CONTRAST: CPT

## 2023-09-12 PROCEDURE — 80053 COMPREHEN METABOLIC PANEL: CPT | Performed by: EMERGENCY MEDICINE

## 2023-09-12 PROCEDURE — 85025 COMPLETE CBC W/AUTO DIFF WBC: CPT | Performed by: EMERGENCY MEDICINE

## 2023-09-12 PROCEDURE — 36415 COLL VENOUS BLD VENIPUNCTURE: CPT | Performed by: EMERGENCY MEDICINE

## 2023-09-12 PROCEDURE — 85730 THROMBOPLASTIN TIME PARTIAL: CPT | Performed by: EMERGENCY MEDICINE

## 2023-09-12 PROCEDURE — 81025 URINE PREGNANCY TEST: CPT | Performed by: EMERGENCY MEDICINE

## 2023-09-12 PROCEDURE — 81003 URINALYSIS AUTO W/O SCOPE: CPT | Performed by: EMERGENCY MEDICINE

## 2023-09-12 RX ORDER — METHYLPREDNISOLONE SODIUM SUCCINATE 125 MG/2ML
125 INJECTION, POWDER, LYOPHILIZED, FOR SOLUTION INTRAMUSCULAR; INTRAVENOUS ONCE
Status: COMPLETED | OUTPATIENT
Start: 2023-09-12 | End: 2023-09-12

## 2023-09-12 RX ORDER — AMOXICILLIN AND CLAVULANATE POTASSIUM 875; 125 MG/1; MG/1
1 TABLET, FILM COATED ORAL EVERY 12 HOURS
Qty: 20 TABLET | Refills: 0 | Status: SHIPPED | OUTPATIENT
Start: 2023-09-12 | End: 2023-09-22

## 2023-09-12 RX ORDER — PREDNISONE 20 MG/1
40 TABLET ORAL DAILY
Qty: 6 TABLET | Refills: 0 | Status: SHIPPED | OUTPATIENT
Start: 2023-09-12 | End: 2023-09-15

## 2023-09-12 RX ORDER — DIPHENHYDRAMINE HYDROCHLORIDE 50 MG/ML
25 INJECTION INTRAMUSCULAR; INTRAVENOUS ONCE
Status: COMPLETED | OUTPATIENT
Start: 2023-09-12 | End: 2023-09-12

## 2023-09-12 RX ORDER — POLYETHYLENE GLYCOL 3350 17 G/17G
17 POWDER, FOR SOLUTION ORAL DAILY
COMMUNITY

## 2023-09-12 RX ORDER — ONDANSETRON 2 MG/ML
4 INJECTION INTRAMUSCULAR; INTRAVENOUS ONCE
Status: COMPLETED | OUTPATIENT
Start: 2023-09-12 | End: 2023-09-12

## 2023-09-12 RX ADMIN — METHYLPREDNISOLONE SODIUM SUCCINATE 125 MG: 125 INJECTION, POWDER, FOR SOLUTION INTRAMUSCULAR; INTRAVENOUS at 12:48

## 2023-09-12 RX ADMIN — SODIUM CHLORIDE 2000 ML: 0.9 INJECTION, SOLUTION INTRAVENOUS at 11:42

## 2023-09-12 RX ADMIN — ONDANSETRON 4 MG: 2 INJECTION INTRAMUSCULAR; INTRAVENOUS at 11:46

## 2023-09-12 RX ADMIN — IOHEXOL 100 ML: 350 INJECTION, SOLUTION INTRAVENOUS at 12:34

## 2023-09-12 RX ADMIN — DIPHENHYDRAMINE HYDROCHLORIDE 25 MG: 50 INJECTION, SOLUTION INTRAMUSCULAR; INTRAVENOUS at 12:48

## 2023-09-12 NOTE — DISCHARGE INSTRUCTIONS
Your potassium was on the low side of normal.  This can happen with repeated vomiting. You are given a dose of potassium here to help increase that. Take Benadryl as needed for itching and hives.

## 2023-09-12 NOTE — ED PROVIDER NOTES
History  Chief Complaint   Patient presents with   • Vomiting     Patient presents to the ED with complaints of vomiting x3 days. She also complains of constipation, which is chronic. She states today that she has not had a bowel movement in 4 days. Patient with a past history of constipation. Has not had a bowel movement for days. Has been taking MiraLAX and other over-the-counter medicines without relief. Now having nausea vomiting for the past 3 days. Complains of left lower quadrant pain. No radiation of the pain. No fevers or chills. No rash. No dysuria or frequency. No history of diverticulitis. History provided by:  Patient   used: No    Vomiting  Severity:  Mild  Duration:  3 days  Timing:  Constant  Progression:  Unchanged  Chronicity:  New  Recent urination:  Decreased  Relieved by:  Nothing  Worsened by:  Nothing  Ineffective treatments:  None tried  Associated symptoms: abdominal pain    Associated symptoms: no chills, no cough, no diarrhea, no fever, no headaches and no sore throat    Abdominal pain:     Location:  LLQ    Quality: aching      Severity:  Mild    Onset quality:  Gradual    Duration:  3 days    Timing:  Constant    Progression:  Worsening    Chronicity:  New  Risk factors: no prior abdominal surgery        Prior to Admission Medications   Prescriptions Last Dose Informant Patient Reported? Taking?   norethindrone-ethinyl estradiol (MICROGESTIN 1/20) 1-20 MG-MCG per tablet   No No   Sig: Take 1 tablet by mouth in the morning Dispense 4 packs at a time, discard placebo pills for the first 3 packs, take placebo pills for the 4th pack   ondansetron (ZOFRAN) 8 mg tablet   No No   Sig: Take 1 tablet (8 mg total) by mouth every 8 (eight) hours as needed for nausea   Patient not taking: No sig reported   polyethylene glycol (MIRALAX) 17 g packet   Yes Yes   Sig: Take 17 g by mouth daily      Facility-Administered Medications: None       History reviewed.  No pertinent past medical history. Past Surgical History:   Procedure Laterality Date   • BREAST BIOPSY Right        Family History   Problem Relation Age of Onset   • Heart disease Father      I have reviewed and agree with the history as documented. E-Cigarette/Vaping   • E-Cigarette Use Never User      E-Cigarette/Vaping Substances   • Nicotine No    • THC No    • CBD No    • Flavoring No    • Other No    • Unknown No      Social History     Tobacco Use   • Smoking status: Some Days     Packs/day: 1.00     Types: Cigarettes   • Smokeless tobacco: Never   Vaping Use   • Vaping Use: Never used   Substance Use Topics   • Alcohol use: Yes     Comment: Rare   • Drug use: Never       Review of Systems   Constitutional: Negative for chills and fever. HENT: Negative for ear pain, hearing loss, sore throat, trouble swallowing and voice change. Eyes: Negative for pain and discharge. Respiratory: Negative for cough, shortness of breath and wheezing. Cardiovascular: Negative for chest pain and palpitations. Gastrointestinal: Positive for abdominal pain and vomiting. Negative for blood in stool, constipation, diarrhea and nausea. Genitourinary: Negative for dysuria, flank pain, frequency and hematuria. Musculoskeletal: Negative for joint swelling, neck pain and neck stiffness. Skin: Negative for rash and wound. Neurological: Negative for dizziness, seizures, syncope, facial asymmetry and headaches. Psychiatric/Behavioral: Negative for hallucinations, self-injury and suicidal ideas. All other systems reviewed and are negative. Physical Exam  Physical Exam  Vitals and nursing note reviewed. Constitutional:       General: She is not in acute distress. Appearance: She is well-developed. HENT:      Head: Normocephalic and atraumatic. Right Ear: External ear normal.      Left Ear: External ear normal.   Eyes:      General: No scleral icterus. Right eye: No discharge.          Left eye: No discharge. Extraocular Movements: Extraocular movements intact. Conjunctiva/sclera: Conjunctivae normal.   Cardiovascular:      Rate and Rhythm: Normal rate and regular rhythm. Heart sounds: Normal heart sounds. No murmur heard. Pulmonary:      Effort: Pulmonary effort is normal.      Breath sounds: Normal breath sounds. No wheezing or rales. Abdominal:      General: Bowel sounds are normal. There is no distension. Palpations: Abdomen is soft. Tenderness: There is abdominal tenderness. There is no guarding or rebound. Comments: Tender left lower quadrant. No guarding or rebound noted. Musculoskeletal:         General: No deformity. Normal range of motion. Cervical back: Normal range of motion and neck supple. Skin:     General: Skin is warm and dry. Findings: No rash. Neurological:      General: No focal deficit present. Mental Status: She is alert and oriented to person, place, and time. Cranial Nerves: No cranial nerve deficit. Psychiatric:         Mood and Affect: Mood normal.         Behavior: Behavior normal.         Thought Content:  Thought content normal.         Judgment: Judgment normal.         Vital Signs  ED Triage Vitals [09/12/23 1124]   Temperature Pulse Respirations Blood Pressure SpO2   98.1 °F (36.7 °C) 90 16 (!) 176/102 98 %      Temp Source Heart Rate Source Patient Position - Orthostatic VS BP Location FiO2 (%)   Temporal Monitor Sitting Right arm --      Pain Score       5           Vitals:    09/12/23 1124 09/12/23 1145   BP: (!) 176/102 150/83   Pulse: 90 78   Patient Position - Orthostatic VS: Sitting          Visual Acuity      ED Medications  Medications   sodium chloride 0.9 % bolus 2,000 mL (2,000 mL Intravenous New Bag 9/12/23 1142)   ondansetron (ZOFRAN) injection 4 mg (4 mg Intravenous Given 9/12/23 1146)   iohexol (OMNIPAQUE) 350 MG/ML injection (SINGLE-DOSE) 100 mL (100 mL Intravenous Given 9/12/23 1234) methylPREDNISolone sodium succinate (Solu-MEDROL) injection 125 mg (125 mg Intravenous Given 9/12/23 1248)   diphenhydrAMINE (BENADRYL) injection 25 mg (25 mg Intravenous Given 9/12/23 1248)       Diagnostic Studies  Results Reviewed     Procedure Component Value Units Date/Time    hCG, quantitative [[id number]]  (Normal) Collected: 09/12/23 1140    Lab Status: Final result Specimen: Blood from Arm, Left Updated: 09/12/23 1228     HCG, Quant <1 mIU/mL     Narrative:       Expected Ranges:    HCG results between 5 and 25 mIU/mL may be indicative of early pregnancy but should be interpreted in light of the total clinical presentation. HCG can rise to detectable levels in maximiliano and post menopausal women (0-11.6 mIU/mL).      Approximate               Approximate HCG  Gestation age          Concentration ( mIU/mL)  _____________          ______________________   Shellia Loth                      HCG values  0.2-1                       5-50  1-2                           2-3                         100-5000  3-4                         500-13964  4-5                         1000-83183  5-6                         [id number]  6-8                         22425-054022  8-12                        [id number]      UA w Reflex to Microscopic w Reflex to Culture [[id number]] Collected: 09/12/23 1208    Lab Status: Final result Specimen: Urine, Clean Catch Updated: 09/12/23 1223     Color, UA Yellow     Clarity, UA Clear     Specific Gravity, UA 1.010     pH, UA 7.0     Leukocytes, UA Negative     Nitrite, UA Negative     Protein, UA Negative mg/dl      Glucose, UA Negative mg/dl      Ketones, UA Negative mg/dl      Urobilinogen, UA 0.2 E.U./dl      Bilirubin, UA Negative     Occult Blood, UA Negative    Comprehensive metabolic panel [[id number]]  (Abnormal) Collected: 09/12/23 1140    Lab Status: Final result Specimen: Blood from Arm, Left Updated: 09/12/23 1213     Sodium 136 mmol/L      Potassium 3.4 mmol/L      Chloride 104 mmol/L      CO2 24 mmol/L      ANION GAP 8 mmol/L      BUN 7 mg/dL      Creatinine 0.72 mg/dL      Glucose 104 mg/dL      Calcium 9.6 mg/dL      AST 11 U/L      ALT 9 U/L      Alkaline Phosphatase 49 U/L      Total Protein 7.8 g/dL      Albumin 4.4 g/dL      Total Bilirubin 0.77 mg/dL      eGFR 111 ml/min/1.73sq m     Narrative:      [facility] (CKD):   •  Stage 1 with normal or high GFR (GFR > 90 mL/min/1.73 square meters)  •  Stage 2 Mild CKD (GFR = 60-89 mL/min/1.73 square meters)  •  Stage 3A Moderate CKD (GFR = 45-59 mL/min/1.73 square meters)  •  Stage 3B Moderate CKD (GFR = 30-44 mL/min/1.73 square meters)  •  Stage 4 Severe CKD (GFR = 15-29 mL/min/1.73 square meters)  •  Stage 5 End Stage CKD (GFR <15 mL/min/1.73 square meters)  Note: GFR calculation is accurate only with a steady state creatinine    Magnesium [[id number]]  (Normal) Collected: 09/12/23 1140    Lab Status: Final result Specimen: Blood from Arm, Left Updated: 09/12/23 1213     Magnesium 2.0 mg/dL     Lipase [[id number]]  (Abnormal) Collected: 09/12/23 1140    Lab Status: Final result Specimen: Blood from Arm, Left Updated: 09/12/23 1213     Lipase <6 u/L     Lactic acid, plasma (w/reflex if result > 2.0) [[id number]]  (Normal) Collected: 09/12/23 1140    Lab Status: Final result Specimen: Blood from Arm, Left Updated: 09/12/23 1212     LACTIC ACID 0.7 mmol/L     Narrative:      Result may be elevated if tourniquet was used during collection.     POCT pregnancy, urine [[id number]]  (Normal) Resulted: 09/12/23 1208    Lab Status: Final result Updated: 09/12/23 1211     EXT Preg Test, Ur Negative     Control Valid    Protime-INR [[id number]]  (Normal) Collected: 09/12/23 1140    Lab Status: Final result Specimen: Blood from Arm, Left Updated: 09/12/23 1209     Protime 13.3 seconds      INR 0.98    APTT [[id number]]  (Normal) Collected: 09/12/23 1140    Lab Status: Final result Specimen: Blood from Arm, Left Updated: 09/12/23 1209     PTT 26 seconds     CBC and differential [[id number]]  (Abnormal) Collected: 09/12/23 1140    Lab Status: Final result Specimen: Blood from Arm, Left Updated: 09/12/23 1152     WBC 8.47 Thousand/uL      RBC 4.72 Million/uL      Hemoglobin 13.0 g/dL      Hematocrit 39.9 %      MCV 85 fL      MCH 27.5 pg      MCHC 32.6 g/dL      RDW 17.1 %      MPV 11.4 fL      Platelets 478 Thousands/uL      nRBC 0 /100 WBCs      Neutrophils Relative 60 %      Immat GRANS % 0 %      Lymphocytes Relative 31 %      Monocytes Relative 9 %      Eosinophils Relative 0 %      Basophils Relative 0 %      Neutrophils Absolute 5.01 Thousands/µL      Immature Grans Absolute 0.02 Thousand/uL      Lymphocytes Absolute 2.61 Thousands/µL      Monocytes Absolute 0.78 Thousand/µL      Eosinophils Absolute 0.02 Thousand/µL      Basophils Absolute 0.03 Thousands/µL                  CT abdomen pelvis with contrast   Final Result by Navya Quiroz MD (09/12 1254)      Mild nonspecific uncomplicated distal proctocolitis. 3 mm nodule in the left lower lobe with uncertain long-term stability and doubtful clinical significance. Based on current Fleischner Society 2017 Guidelines on incidental pulmonary nodule, in this patient who is less than 39years of age, management    decisions should be made on a case by case basis, keeping in mind that infectious causes are more likely than cancer and that use of serial CT should be minimized. Workstation performed: [id number]                    Procedures  Procedures         ED Course  ED Course as of 09/12/23 1300   Tue Sep 12, 2023   1246 Patient returned from CAT scan. Now with generalized itching and hives. No trouble breathing. Lungs are clear to auscultation. Will give Solu-Medrol and Benadryl. 1257 Patient seen. Feeling better. No respiratory complaints.                                SBIRT 22yo+    Flowsheet Row Most Recent Value   Initial Alcohol Screen: US AUDIT-C     1. How often do you have a drink containing alcohol? 0 Filed at: 09/12/2023 1127   2. How many drinks containing alcohol do you have on a typical day you are drinking? 0 Filed at: 09/12/2023 1127   3b. FEMALE Any Age, or MALE 65+: How often do you have 4 or more drinks on one occassion? 0 Filed at: 09/12/2023 1127   Audit-C Score 0 Filed at: 09/12/2023 1127   YUNIOR: How many times in the past year have you. .. Used an illegal drug or used a prescription medication for non-medical reasons? Never Filed at: 09/12/2023 1127                    Medical Decision Making  Amount and/or Complexity of Data Reviewed  Labs: ordered. Decision-making details documented in ED Course. Radiology: ordered. Decision-making details documented in ED Course. Discussion of management or test interpretation with external provider(s): Differential diagnosis includes but not limited to STEMI, NSTEMI, cholelithiasis, cholecystitis, peptic ulcer disease, gastritis, pancreatitis, diverticulitis, colitis, bowel obstruction, appendicitis, UTI, ureteral stone, kidney stone, inflammatory bowel disease. Risk  Prescription drug management. Disposition  Final diagnoses:   Hypokalemia   Proctocolitis   Allergic reaction, initial encounter - Probably secondary to IV dye. Time reflects when diagnosis was documented in both MDM as applicable and the Disposition within this note     Time User Action Codes Description Comment    9/12/2023 12:13 PM Melania APZ Add [E87.6] Hypokalemia     9/12/2023 12:58 PM Jany Dozier Add [K52.9] Proctocolitis     9/12/2023 12:58 PM Roxanne Smith Add [T78.40XA] Allergic reaction, initial encounter     9/12/2023 12:58 PM Roxanne Smith Modify [T78.40XA] Allergic reaction, initial encounter Probably secondary to IV dye.       ED Disposition     ED Disposition   Discharge    Condition   Stable    Date/Time   Tue Sep 12, 2023 12:58 PM Comment   [facility] discharge to home/self care. Follow-up Information    None         Patient's Medications   Discharge Prescriptions    AMOXICILLIN-CLAVULANATE (AUGMENTIN) 875-125 MG PER TABLET    Take 1 tablet by mouth every 12 (twelve) hours for 10 days       Start Date: 9/12/2023 End Date: 9/22/2023       Order Dose: 1 tablet       Quantity: 20 tablet    Refills: 0    PREDNISONE 20 MG TABLET    Take 2 tablets (40 mg total) by mouth daily for 3 days       Start Date: 9/12/2023 End Date: 9/15/2023       Order Dose: 40 mg       Quantity: 6 tablet    Refills: 0       No discharge procedures on file.     PDMP Review     None          ED Provider  Electronically Signed by           Marcio Smyth MD  09/12/23 1110 Mickey Mcgowan MD  09/12/23 1300

## 2023-09-12 NOTE — ED NOTES
Pt c/o hives following return from CT. Provider aware, orders to follow,.      Dianne Watson RN  09/12/23 4586

## 2023-09-21 ENCOUNTER — OFFICE VISIT (OUTPATIENT)
Dept: FAMILY MEDICINE CLINIC | Facility: CLINIC | Age: 33
End: 2023-09-21
Payer: COMMERCIAL

## 2023-09-21 VITALS
SYSTOLIC BLOOD PRESSURE: 133 MMHG | DIASTOLIC BLOOD PRESSURE: 90 MMHG | WEIGHT: 148.4 LBS | BODY MASS INDEX: 26.29 KG/M2 | HEIGHT: 63 IN | OXYGEN SATURATION: 98 % | HEART RATE: 75 BPM

## 2023-09-21 DIAGNOSIS — K52.9 COLITIS: Primary | ICD-10-CM

## 2023-09-21 DIAGNOSIS — R68.89 FLU-LIKE SYMPTOMS: ICD-10-CM

## 2023-09-21 DIAGNOSIS — R91.1 INCIDENTAL LUNG NODULE, > 3MM AND < 8MM: ICD-10-CM

## 2023-09-21 PROCEDURE — 99214 OFFICE O/P EST MOD 30 MIN: CPT | Performed by: FAMILY MEDICINE

## 2023-09-21 RX ORDER — OMEPRAZOLE 40 MG/1
40 CAPSULE, DELAYED RELEASE ORAL DAILY
COMMUNITY
Start: 2023-01-06 | End: 2023-09-21

## 2023-09-21 RX ORDER — ONDANSETRON HYDROCHLORIDE 8 MG/1
8 TABLET, FILM COATED ORAL EVERY 8 HOURS PRN
Qty: 20 TABLET | Refills: 1 | Status: SHIPPED | OUTPATIENT
Start: 2023-09-21

## 2023-09-21 RX ORDER — NORETHINDRONE ACETATE AND ETHINYL ESTRADIOL AND FERROUS FUMARATE 1MG-20(21)
KIT ORAL
COMMUNITY
Start: 2023-09-12

## 2023-09-21 NOTE — PROGRESS NOTES
Assessment/Plan:       1. Colitis  Comments:  complete abx  ref to gastroenterology  Orders:  -     Ambulatory Referral to Gastroenterology; Future    2. Incidental lung nodule, > 3mm and < 8mm  Comments:  repeat ct scan in 1 year  Orders:  -     CT chest wo contrast; Future; Expected date: 09/21/2024    3. Flu-like symptoms  -     ondansetron (ZOFRAN) 8 mg tablet; Take 1 tablet (8 mg total) by mouth every 8 (eight) hours as needed for nausea          Subjective:      Patient ID: Sophie Alvarenga is a 34 y.o. female. Christiano Adams is here for her initial visit. She is a very healthy and pleasant 81-year-old woman. She lives in the area with her fiancé. She recently ended up in the emergency room after several days of not feeling well she had a lot of stomach cramping and pain with nausea and vomiting and constipation. This is not her first bout or attack like this. She does have a family history of ulcerative colitis in her mother and grandmother. She also has a family history of colon cancer. She did not have blood in her stool or black stool. Her CAT scan showed a distal rectal distal colon colitis. She was given Augmentin and seems to be getting better still has intermittent nausea. She also had an incidental lung nodule in the left lower lobe that was tiny at 3 mm she does smoke on just can repeat a CAT scan in 1 year for that. Again she is recovering but still has some residual symptoms. Otherwise completely healthy on no meds. She takes Zofran as needed for the nausea. The following portions of the patient's history were reviewed and updated as appropriate: allergies, current medications, past family history, past medical history, past social history, past surgical history, and problem list.    Review of Systems   Respiratory: Negative. Cardiovascular: Negative.           Objective:      /90 (BP Location: Right arm, Patient Position: Sitting, Cuff Size: Standard)   Pulse 75   Ht 5' 3" (1.6 m)   Wt 67.3 kg (148 lb 6.4 oz)   SpO2 98%   BMI 26.29 kg/m²          Physical Exam  Vitals and nursing note reviewed. Constitutional:       Appearance: Normal appearance. HENT:      Head: Normocephalic and atraumatic. Nose: Nose normal.      Mouth/Throat:      Mouth: Mucous membranes are moist.   Eyes:      Extraocular Movements: Extraocular movements intact. Pupils: Pupils are equal, round, and reactive to light. Cardiovascular:      Rate and Rhythm: Normal rate and regular rhythm. Pulses: Normal pulses. Pulmonary:      Effort: Pulmonary effort is normal.      Breath sounds: Normal breath sounds. Abdominal:      General: Bowel sounds are normal.      Palpations: Abdomen is soft. Musculoskeletal:      Cervical back: Normal range of motion. Skin:     General: Skin is warm and dry. Capillary Refill: Capillary refill takes less than 2 seconds. Neurological:      General: No focal deficit present. Mental Status: She is alert.    Psychiatric:         Mood and Affect: Mood normal.

## 2024-06-15 DIAGNOSIS — Z00.6 ENCOUNTER FOR EXAMINATION FOR NORMAL COMPARISON OR CONTROL IN CLINICAL RESEARCH PROGRAM: ICD-10-CM

## 2025-04-08 DIAGNOSIS — N63.15 UNSPECIFIED LUMP IN THE RIGHT BREAST, OVERLAPPING QUADRANTS: ICD-10-CM

## 2025-07-30 DIAGNOSIS — R68.89 FLU-LIKE SYMPTOMS: ICD-10-CM

## 2025-07-31 RX ORDER — ONDANSETRON 8 MG/1
8 TABLET, FILM COATED ORAL EVERY 8 HOURS PRN
Qty: 20 TABLET | Refills: 0 | Status: SHIPPED | OUTPATIENT
Start: 2025-07-31

## 2025-08-04 ENCOUNTER — HOSPITAL ENCOUNTER (OUTPATIENT)
Dept: GASTROENTEROLOGY | Facility: HOSPITAL | Age: 35
Setting detail: OUTPATIENT SURGERY
Discharge: HOME | End: 2025-08-04
Attending: HOSPITALIST
Payer: COMMERCIAL

## 2025-08-04 ENCOUNTER — ANESTHESIA EVENT (OUTPATIENT)
Dept: GASTROENTEROLOGY | Facility: HOSPITAL | Age: 35
End: 2025-08-04
Payer: COMMERCIAL

## 2025-08-04 ENCOUNTER — ANESTHESIA (OUTPATIENT)
Dept: GASTROENTEROLOGY | Facility: HOSPITAL | Age: 35
End: 2025-08-04
Payer: COMMERCIAL

## 2025-08-04 VITALS
HEART RATE: 74 BPM | BODY MASS INDEX: 26.75 KG/M2 | HEIGHT: 63 IN | WEIGHT: 151 LBS | OXYGEN SATURATION: 99 % | RESPIRATION RATE: 18 BRPM | TEMPERATURE: 97.7 F | DIASTOLIC BLOOD PRESSURE: 83 MMHG | SYSTOLIC BLOOD PRESSURE: 143 MMHG

## 2025-08-04 DIAGNOSIS — R19.4 CHANGE IN BOWEL HABIT: ICD-10-CM

## 2025-08-04 PROBLEM — Z87.891 SMOKING HISTORY: Status: ACTIVE | Noted: 2025-08-04

## 2025-08-04 LAB
EXT PREGNANCY TEST URINE: NEGATIVE
EXT. CONTROL: NORMAL

## 2025-08-04 PROCEDURE — 81025 URINE PREGNANCY TEST: CPT | Performed by: HOSPITALIST

## 2025-08-04 PROCEDURE — 88305 TISSUE EXAM BY PATHOLOGIST: CPT | Performed by: PATHOLOGY

## 2025-08-04 RX ORDER — LIDOCAINE HYDROCHLORIDE 10 MG/ML
INJECTION, SOLUTION EPIDURAL; INFILTRATION; INTRACAUDAL; PERINEURAL AS NEEDED
Status: DISCONTINUED | OUTPATIENT
Start: 2025-08-04 | End: 2025-08-04

## 2025-08-04 RX ORDER — SODIUM CHLORIDE, SODIUM LACTATE, POTASSIUM CHLORIDE, CALCIUM CHLORIDE 600; 310; 30; 20 MG/100ML; MG/100ML; MG/100ML; MG/100ML
INJECTION, SOLUTION INTRAVENOUS CONTINUOUS PRN
Status: DISCONTINUED | OUTPATIENT
Start: 2025-08-04 | End: 2025-08-04

## 2025-08-04 RX ORDER — PROPOFOL 10 MG/ML
INJECTION, EMULSION INTRAVENOUS AS NEEDED
Status: DISCONTINUED | OUTPATIENT
Start: 2025-08-04 | End: 2025-08-04

## 2025-08-04 RX ADMIN — SODIUM CHLORIDE, SODIUM LACTATE, POTASSIUM CHLORIDE, AND CALCIUM CHLORIDE: .6; .31; .03; .02 INJECTION, SOLUTION INTRAVENOUS at 10:55

## 2025-08-04 RX ADMIN — Medication 40 MG: at 11:49

## 2025-08-04 RX ADMIN — LIDOCAINE HYDROCHLORIDE 50 MG: 10 INJECTION, SOLUTION EPIDURAL; INFILTRATION; INTRACAUDAL at 11:45

## 2025-08-04 RX ADMIN — PROPOFOL 100 MG: 10 INJECTION, EMULSION INTRAVENOUS at 11:45

## 2025-08-04 RX ADMIN — PROPOFOL 150 MCG/KG/MIN: 10 INJECTION, EMULSION INTRAVENOUS at 11:46

## 2025-08-08 PROCEDURE — 88305 TISSUE EXAM BY PATHOLOGIST: CPT | Performed by: PATHOLOGY
